# Patient Record
Sex: MALE | Race: WHITE | Employment: PART TIME | URBAN - NONMETROPOLITAN AREA
[De-identification: names, ages, dates, MRNs, and addresses within clinical notes are randomized per-mention and may not be internally consistent; named-entity substitution may affect disease eponyms.]

---

## 2020-10-22 ENCOUNTER — HOSPITAL ENCOUNTER (EMERGENCY)
Age: 70
Discharge: HOME | End: 2020-10-22
Attending: FAMILY MEDICINE
Payer: MEDICARE

## 2020-10-22 ENCOUNTER — APPOINTMENT (OUTPATIENT)
Dept: CT IMAGING | Age: 70
End: 2020-10-22
Payer: MEDICARE

## 2020-10-22 VITALS
WEIGHT: 218 LBS | DIASTOLIC BLOOD PRESSURE: 81 MMHG | HEART RATE: 68 BPM | RESPIRATION RATE: 13 BRPM | SYSTOLIC BLOOD PRESSURE: 128 MMHG | OXYGEN SATURATION: 100 % | TEMPERATURE: 97.6 F

## 2020-10-22 LAB
ALBUMIN SERPL-MCNC: 4.1 G/DL (ref 3.5–5.1)
ALP BLD-CCNC: 69 U/L (ref 38–126)
ALT SERPL-CCNC: 12 U/L (ref 11–66)
ANION GAP SERPL CALCULATED.3IONS-SCNC: 11 MEQ/L (ref 8–16)
AST SERPL-CCNC: 16 U/L (ref 5–40)
BASOPHILS # BLD: 0.3 %
BASOPHILS ABSOLUTE: 0 THOU/MM3 (ref 0–0.1)
BILIRUB SERPL-MCNC: 0.5 MG/DL (ref 0.3–1.2)
BILIRUBIN DIRECT: < 0.2 MG/DL (ref 0–0.3)
BUN BLDV-MCNC: 28 MG/DL (ref 7–22)
CALCIUM SERPL-MCNC: 8.9 MG/DL (ref 8.5–10.5)
CHLORIDE BLD-SCNC: 108 MEQ/L (ref 98–111)
CO2: 24 MEQ/L (ref 23–33)
CREAT SERPL-MCNC: 1.4 MG/DL (ref 0.4–1.2)
D-DIMER QUANTITATIVE: 396 NG/ML FEU (ref 0–500)
EKG ATRIAL RATE: 56 BPM
EKG P AXIS: 52 DEGREES
EKG P-R INTERVAL: 212 MS
EKG Q-T INTERVAL: 410 MS
EKG QRS DURATION: 96 MS
EKG QTC CALCULATION (BAZETT): 395 MS
EKG R AXIS: 5 DEGREES
EKG T AXIS: 29 DEGREES
EKG VENTRICULAR RATE: 56 BPM
EOSINOPHIL # BLD: 2.4 %
EOSINOPHILS ABSOLUTE: 0.2 THOU/MM3 (ref 0–0.4)
ERYTHROCYTE [DISTWIDTH] IN BLOOD BY AUTOMATED COUNT: 13.4 % (ref 11.5–14.5)
ERYTHROCYTE [DISTWIDTH] IN BLOOD BY AUTOMATED COUNT: 46.6 FL (ref 35–45)
GFR SERPL CREATININE-BSD FRML MDRD: 50 ML/MIN/1.73M2
GLUCOSE BLD-MCNC: 94 MG/DL (ref 70–108)
GLUCOSE BLD-MCNC: 97 MG/DL (ref 70–108)
HCT VFR BLD CALC: 43.1 % (ref 42–52)
HEMOCCULT STL QL: NEGATIVE
HEMOGLOBIN: 13.9 GM/DL (ref 14–18)
IMMATURE GRANS (ABS): 0.02 THOU/MM3 (ref 0–0.07)
IMMATURE GRANULOCYTES: 0.3 %
LYMPHOCYTES # BLD: 21.8 %
LYMPHOCYTES ABSOLUTE: 1.5 THOU/MM3 (ref 1–4.8)
MCH RBC QN AUTO: 30.8 PG (ref 26–33)
MCHC RBC AUTO-ENTMCNC: 32.3 GM/DL (ref 32.2–35.5)
MCV RBC AUTO: 95.4 FL (ref 80–94)
MONOCYTES # BLD: 8.9 %
MONOCYTES ABSOLUTE: 0.6 THOU/MM3 (ref 0.4–1.3)
NUCLEATED RED BLOOD CELLS: 0 /100 WBC
OSMOLALITY CALCULATION: 290.2 MOSMOL/KG (ref 275–300)
PLATELET # BLD: 150 THOU/MM3 (ref 130–400)
PMV BLD AUTO: 10.6 FL (ref 9.4–12.4)
POTASSIUM SERPL-SCNC: 4.3 MEQ/L (ref 3.5–5.2)
RBC # BLD: 4.52 MILL/MM3 (ref 4.7–6.1)
SEG NEUTROPHILS: 66.3 %
SEGMENTED NEUTROPHILS ABSOLUTE COUNT: 4.4 THOU/MM3 (ref 1.8–7.7)
SODIUM BLD-SCNC: 143 MEQ/L (ref 135–145)
TOTAL PROTEIN: 6 G/DL (ref 6.1–8)
TROPONIN T: < 0.01 NG/ML
WBC # BLD: 6.7 THOU/MM3 (ref 4.8–10.8)

## 2020-10-22 PROCEDURE — 99285 EMERGENCY DEPT VISIT HI MDM: CPT

## 2020-10-22 PROCEDURE — 85025 COMPLETE CBC W/AUTO DIFF WBC: CPT

## 2020-10-22 PROCEDURE — 93225 XTRNL ECG REC<48 HRS REC: CPT

## 2020-10-22 PROCEDURE — 96361 HYDRATE IV INFUSION ADD-ON: CPT

## 2020-10-22 PROCEDURE — 84484 ASSAY OF TROPONIN QUANT: CPT

## 2020-10-22 PROCEDURE — 82272 OCCULT BLD FECES 1-3 TESTS: CPT

## 2020-10-22 PROCEDURE — 93005 ELECTROCARDIOGRAM TRACING: CPT | Performed by: FAMILY MEDICINE

## 2020-10-22 PROCEDURE — 82948 REAGENT STRIP/BLOOD GLUCOSE: CPT

## 2020-10-22 PROCEDURE — 82248 BILIRUBIN DIRECT: CPT

## 2020-10-22 PROCEDURE — 85379 FIBRIN DEGRADATION QUANT: CPT

## 2020-10-22 PROCEDURE — 96374 THER/PROPH/DIAG INJ IV PUSH: CPT

## 2020-10-22 PROCEDURE — 70450 CT HEAD/BRAIN W/O DYE: CPT

## 2020-10-22 PROCEDURE — 2580000003 HC RX 258: Performed by: FAMILY MEDICINE

## 2020-10-22 PROCEDURE — 80053 COMPREHEN METABOLIC PANEL: CPT

## 2020-10-22 PROCEDURE — 6360000002 HC RX W HCPCS: Performed by: FAMILY MEDICINE

## 2020-10-22 PROCEDURE — 93226 XTRNL ECG REC<48 HR SCAN A/R: CPT

## 2020-10-22 PROCEDURE — 36415 COLL VENOUS BLD VENIPUNCTURE: CPT

## 2020-10-22 RX ORDER — 0.9 % SODIUM CHLORIDE 0.9 %
2000 INTRAVENOUS SOLUTION INTRAVENOUS ONCE
Status: COMPLETED | OUTPATIENT
Start: 2020-10-22 | End: 2020-10-22

## 2020-10-22 RX ORDER — ATROPINE SULFATE 0.1 MG/ML
0.5 INJECTION INTRAVENOUS ONCE
Status: COMPLETED | OUTPATIENT
Start: 2020-10-22 | End: 2020-10-22

## 2020-10-22 RX ORDER — 0.9 % SODIUM CHLORIDE 0.9 %
1000 INTRAVENOUS SOLUTION INTRAVENOUS ONCE
Status: COMPLETED | OUTPATIENT
Start: 2020-10-22 | End: 2020-10-22

## 2020-10-22 RX ORDER — NAPROXEN 250 MG/1
250 TABLET ORAL DAILY
COMMUNITY

## 2020-10-22 RX ADMIN — ATROPINE SULFATE 0.5 MG: 0.1 INJECTION, SOLUTION ENDOTRACHEAL; INTRAMUSCULAR; INTRAVENOUS; SUBCUTANEOUS at 16:25

## 2020-10-22 RX ADMIN — SODIUM CHLORIDE 2000 ML: 9 INJECTION, SOLUTION INTRAVENOUS at 18:13

## 2020-10-22 RX ADMIN — ATROPINE SULFATE 0.5 MG: 0.1 INJECTION, SOLUTION ENDOTRACHEAL; INTRAMUSCULAR; INTRAVENOUS; SUBCUTANEOUS at 16:29

## 2020-10-22 RX ADMIN — SODIUM CHLORIDE 1000 ML: 9 INJECTION, SOLUTION INTRAVENOUS at 16:52

## 2020-10-22 ASSESSMENT — ENCOUNTER SYMPTOMS
BACK PAIN: 0
NAUSEA: 1
ABDOMINAL PAIN: 0
SHORTNESS OF BREATH: 0
VOMITING: 0

## 2020-10-22 NOTE — ED PROVIDER NOTES
[facility]  eMERGENCY dEPARTMENT eNCOUnter          CHIEF COMPLAINT       Chief Complaint   Patient presents with    Loss of Consciousness       Nurses Notes reviewed and I agree except as noted in the HPI. HISTORY OF PRESENT ILLNESS    Alyec Thurman is a 78 y.o. male who presents with syncopal episode    Location/Symptom: syncope  Timing/Onset: 10/22/2020  Context/Setting: He was sitting at the table. He had felt lightheaded and swollen, passed out  Quality: He was lightheaded. he went to put his head down and then  Passed out. Did not fall or get injured   he was profusely diaphoretic  He came to gradually  No chest pain. Not short of breath  Duration: Brief episode of a few minutes  Modifying Factors: He was brought in by EMS laid down  Severity: 7/10    REVIEW OF SYSTEMS     Review of Systems   Constitutional: Positive for diaphoresis. HENT: Negative for congestion. Respiratory: Negative for shortness of breath. Cardiovascular: Negative for chest pain, palpitations and leg swelling. Gastrointestinal: Positive for nausea. Negative for abdominal pain and vomiting. Genitourinary: Negative for difficulty urinating. Musculoskeletal: Negative for back pain, joint swelling and neck pain. Skin: Negative for rash. Neurological: Negative for seizures, speech difficulty and headaches. Hematological: Negative for adenopathy. Psychiatric/Behavioral: Negative for confusion. PAST MEDICAL HISTORY    has no past medical history on file. SURGICAL HISTORY      has no past surgical history on file. CURRENT MEDICATIONS       Previous Medications    NAPROXEN (NAPROSYN) 250 MG TABLET    Take 250 mg by mouth daily       ALLERGIES     has No Known Allergies. FAMILY HISTORY     has no family status information on file. family history is not on file. SOCIAL HISTORY          PHYSICAL EXAM     INITIAL VITALS:  weight is 218 lb (98.9 kg).  His oral temperature is 97.6 °F (36.4 °C). His blood pressure is 128/81 and his pulse is 68. His respiration is 13 and oxygen saturation is 100%. Physical Exam  Vitals signs and nursing note reviewed. Constitutional:       Comments: GCS 14  Opens eyes to voice   HENT:      Head: Normocephalic and atraumatic. Eyes:      General: No scleral icterus. Extraocular Movements: Extraocular movements intact. Pupils: Pupils are equal, round, and reactive to light. Neck:      Musculoskeletal: Normal range of motion and neck supple. No neck rigidity or muscular tenderness. Comments: No meningismus  Cardiovascular:      Rate and Rhythm: Normal rate and regular rhythm. Pulses: Normal pulses. Heart sounds: Normal heart sounds. Pulmonary:      Effort: Pulmonary effort is normal.      Breath sounds: Normal breath sounds. Abdominal:      Palpations: Abdomen is soft. Tenderness: There is no abdominal tenderness. There is no rebound. Musculoskeletal:         General: No swelling or tenderness. Lymphadenopathy:      Cervical: No cervical adenopathy. Skin:     General: Skin is warm and dry. Findings: No rash. Neurological:      General: No focal deficit present. Mental Status: He is alert. Motor: No weakness. Psychiatric:         Mood and Affect: Mood normal.         Behavior: Behavior normal.           DIFFERENTIAL DIAGNOSIS:     Syncopal episode    With lightheaded unclear cause    No chest pain or symptoms of arrhythmia noted    Consider pulmonary embolism    DIAGNOSTIC RESULTS     EKG: All EKG's are interpreted by the Emergency Department Physician who either signs or Co-signs this chart in the absence of a cardiologist.    EKG showed sinus rhythm with rate 56. QRS complexes show normal axis, normal conduction.   ST-T waves show no acute change        RADIOLOGY: non-plain film images(s) such as CT, Ultrasound and MRI are read by the radiologist.  The patient had a multiple view CT scan of the brain which demonstrates no acute findings. [x] Visualized and interpreted by me   [x] Radiologist's Wet Read Report Reviewed   [] Discussed with Radiologist.    LABS:   Labs Reviewed   CBC WITH AUTO DIFFERENTIAL - Abnormal; Notable for the following components:       Result Value    RBC 4.52 (*)     Hemoglobin 13.9 (*)     MCV 95.4 (*)     RDW-SD 46.6 (*)     All other components within normal limits   BASIC METABOLIC PANEL - Abnormal; Notable for the following components:    BUN 28 (*)     CREATININE 1.4 (*)     All other components within normal limits   HEPATIC FUNCTION PANEL - Abnormal; Notable for the following components: Total Protein 6.0 (*)     All other components within normal limits   GLOMERULAR FILTRATION RATE, ESTIMATED - Abnormal; Notable for the following components:    Est, Glom Filt Rate 50 (*)     All other components within normal limits   BLOOD OCCULT STOOL SCREEN #1   D-DIMER, QUANTITATIVE   TROPONIN   ANION GAP   OSMOLALITY   POCT GLUCOSE       EMERGENCY DEPARTMENT COURSE:   Vitals:    Vitals:    10/22/20 1653 10/22/20 1712 10/22/20 1813 10/22/20 1844   BP: 104/71 121/79 (!) 128/91 128/81   Pulse: 72 72 67 68   Resp: 18 13 11 13   Temp:       TempSrc:       SpO2: 96% 100% 100% 100%   Weight:         Nursing notes reviewed    Saline bolus 2 L    Cardiac monitor he remains sinus bradycardia between 50-60    Blood pressure did drop to 88 systolic    With bradycardia and hypotension given atropine 0.5 mg IV    No change in pulse or blood pressure was given second dose of atropine 0.5 mg IV    With the second dose heart rate increased to between 60-70, blood pressure improved to 433 systolic. With continued fluids he continued to feel better    Normal CBC    BUN/creatinine 28/1.4  No baseline for comparison    D-dimer normal going against PE    Normal troponin    After 2 L of fluid she is feeling much better    Orthostatic Blood Pressures:  BP  135/86, P 68 lying.   BP  138/69, P 79 standing. Holter monitor was applied to look for arrhythmias    He feels much better and is comfortable     Cause of syncope unclear but appears to be a vagal episode    discharge home        CRITICAL CARE:   none    CONSULTS:  none    PROCEDURES:  None    FINAL IMPRESSION      1.  Vasovagal syncope          DISPOSITION/PLAN     Outpatient Holter is been started    Primary care next week    PATIENT REFERRED TO:  Raúl Rodriguez, [address] [place] [address]  [phone]    In 1 week        DISCHARGE MEDICATIONS:  New Prescriptions    No medications on file       (Please note that portions of this note were completed with a voice recognition program.  Efforts were made to edit the dictations but occasionally words are mis-transcribed.)    Oswald Lefort, MD Oswald Lefort, MD  10/22/20 2121       Oswald Lefort, MD  10/22/20 2122

## 2020-10-22 NOTE — ED TRIAGE NOTES
Pt presents to ED via EMS. He was sitting at the table at his friends, he had about a half a beer, and was getting ready to go out to eat. Per Spouse and report pt suddenly passed out at the table. Per report pt briefly lost consciousness en route. Pt diaphoretic, pale, A&O. EKG complete, Dr. Nico Trent at bedside.  IV fluids infusing upon arrival

## 2020-10-22 NOTE — PROCEDURES
The skin was prepped and a holter monitor was applied. The patient was instructed on the documentation of symptoms and the purpose of the holter as well as the things to avoid while wearing the holter. The patient was instructed to remove and return the holter on 10/24/2020. The serial number of the holter that was applied is [id number].

## 2020-10-22 NOTE — ED NOTES
Bed: 024A  Expected date: 10/22/20  Expected time: 3:48 PM  Means of arrival:   Comments:     Aiyana Taylor, RN  10/22/20 1600

## 2020-10-23 PROCEDURE — 93010 ELECTROCARDIOGRAM REPORT: CPT | Performed by: NUCLEAR MEDICINE

## 2020-10-25 LAB
ACQUISITION DURATION: NORMAL S
AVERAGE HEART RATE: 62 BPM
FASTEST SUPRAVENTRICULAR RATE: 96 BPM
HOOKUP DATE: NORMAL
HOOKUP TIME: NORMAL
LONGEST SUPRAVENTRICULAR RATE: 96 BPM
MAX HEART RATE TIME/DATE: NORMAL
MAX HEART RATE: 106 BPM
MIN HEART RATE TIME/DATE: NORMAL
MIN HEART RATE: 38 BPM
NUMBER OF FASTEST SUPRAVENTRICULAR BEATS: 3
NUMBER OF LONGEST SUPRAVENTRICULAR BEATS: 3
NUMBER OF QRS COMPLEXES: NORMAL
NUMBER OF SUPRAVENTRICULAR BEATS IN RUNS: 3
NUMBER OF SUPRAVENTRICULAR COUPLETS: 5
NUMBER OF SUPRAVENTRICULAR ECTOPICS: 53
NUMBER OF SUPRAVENTRICULAR ISOLATED BEATS: 40
NUMBER OF SUPRAVENTRICULAR RUNS: 1
NUMBER OF VENTRICULAR BEATS IN RUNS: 0
NUMBER OF VENTRICULAR BIGEMINAL CYCLES: 0
NUMBER OF VENTRICULAR COUPLETS: 0
NUMBER OF VENTRICULAR ECTOPICS: 1
NUMBER OF VENTRICULAR ISOLATED BEATS: 1
NUMBER OF VENTRICULAR RUNS: 0

## 2022-12-16 NOTE — ED NOTES
Dr. Damon Hicks remains at bedside.      Larry Cortez RN  10/22/20 9981 Helical Rim Text: The closure involved the helical rim.